# Patient Record
Sex: FEMALE | Race: WHITE | URBAN - METROPOLITAN AREA
[De-identification: names, ages, dates, MRNs, and addresses within clinical notes are randomized per-mention and may not be internally consistent; named-entity substitution may affect disease eponyms.]

---

## 2021-02-24 ENCOUNTER — NEW PATIENT (OUTPATIENT)
Dept: URBAN - METROPOLITAN AREA CLINIC 44 | Facility: CLINIC | Age: 71
End: 2021-02-24
Payer: MEDICARE

## 2021-02-24 DIAGNOSIS — H57.11 OCULAR PAIN, RIGHT EYE: Primary | ICD-10-CM

## 2021-02-24 DIAGNOSIS — H43.393 OTHER VITREOUS OPACITIES, BILATERAL: ICD-10-CM

## 2021-02-24 DIAGNOSIS — H04.123 TEAR FILM INSUFFICIENCY OF BILATERAL LACRIMAL GLANDS: ICD-10-CM

## 2021-02-24 PROCEDURE — 92134 CPTRZ OPH DX IMG PST SGM RTA: CPT | Performed by: OPTOMETRIST

## 2021-02-24 PROCEDURE — 92004 COMPRE OPH EXAM NEW PT 1/>: CPT | Performed by: OPTOMETRIST

## 2021-02-24 ASSESSMENT — INTRAOCULAR PRESSURE
OS: 14
OD: 14

## 2021-02-24 ASSESSMENT — VISUAL ACUITY
OS: 20/30
OD: 20/30

## 2022-02-28 ENCOUNTER — OFFICE VISIT (OUTPATIENT)
Dept: URBAN - METROPOLITAN AREA CLINIC 44 | Facility: CLINIC | Age: 72
End: 2022-02-28
Payer: MEDICARE

## 2022-02-28 DIAGNOSIS — H02.201 LAGOPHTHALMOS OF RIGHT UPPER LID: ICD-10-CM

## 2022-02-28 DIAGNOSIS — H35.371 PUCKERING OF MACULA, RIGHT EYE: Primary | ICD-10-CM

## 2022-02-28 DIAGNOSIS — H52.223 REGULAR ASTIGMATISM, BILATERAL: ICD-10-CM

## 2022-02-28 DIAGNOSIS — Z96.1 PRESENCE OF INTRAOCULAR LENS: ICD-10-CM

## 2022-02-28 PROCEDURE — 92014 COMPRE OPH EXAM EST PT 1/>: CPT | Performed by: OPTOMETRIST

## 2022-02-28 PROCEDURE — 92134 CPTRZ OPH DX IMG PST SGM RTA: CPT | Performed by: OPTOMETRIST

## 2022-02-28 ASSESSMENT — VISUAL ACUITY
OS: 20/30
OD: 20/25

## 2022-02-28 ASSESSMENT — INTRAOCULAR PRESSURE
OD: 12
OS: 10

## 2022-02-28 ASSESSMENT — KERATOMETRY
OS: 42.13
OD: 44.25

## 2022-02-28 NOTE — IMPRESSION/PLAN
Impression: Puckering of macula, right eye Patient has no significant visual complaints at this time. Denies distortion. Per OCT no ME. Plan: Discussed findings with patient. Observe. RTC 12 months for complete + possible OCT(Mac).  Sooner if changes in vision are observed

## 2022-02-28 NOTE — IMPRESSION/PLAN
Impression: Lagophthalmos of right upper lid Stable Plan: PLAN: Continue with taping eye shut at night. Aggressive PF AT and jeane or gel before bedtime.

## 2022-02-28 NOTE — IMPRESSION/PLAN
Impression: Tear film insufficiency of bilateral lacrimal glands Plan: PLAN: Recommend Lipid based tears to be used 4X daily. Rec 2000 mg Triglyceride based Omega 3 to be taken daily. Observe condition and RTC if symptom's worsen.

## 2022-02-28 NOTE — IMPRESSION/PLAN
Impression: Other vitreous opacities, bilateral
 Reports no new floaters, flashes  or veiling. No retinal defects noted. Patient reports occasional floaters which are not interfering with daily activities and vision. Plan: Patient states vision is stable with no new floaters, flashes  or veiling. Reports they see occasional floaters which are not interfering with daily activities and vision.  PLAN: RTC if experiences increase in floaters, flashes or veiling

## 2023-03-01 ENCOUNTER — OFFICE VISIT (OUTPATIENT)
Dept: URBAN - METROPOLITAN AREA CLINIC 44 | Facility: CLINIC | Age: 73
End: 2023-03-01
Payer: MEDICARE

## 2023-03-01 DIAGNOSIS — H02.201 LAGOPHTHALMOS OF RIGHT UPPER LID: ICD-10-CM

## 2023-03-01 DIAGNOSIS — H04.123 TEAR FILM INSUFFICIENCY OF BILATERAL LACRIMAL GLANDS: Primary | ICD-10-CM

## 2023-03-01 DIAGNOSIS — Z96.1 PRESENCE OF INTRAOCULAR LENS: ICD-10-CM

## 2023-03-01 DIAGNOSIS — H43.393 OTHER VITREOUS OPACITIES, BILATERAL: ICD-10-CM

## 2023-03-01 DIAGNOSIS — H52.223 REGULAR ASTIGMATISM, BILATERAL: ICD-10-CM

## 2023-03-01 PROCEDURE — 99214 OFFICE O/P EST MOD 30 MIN: CPT | Performed by: OPTOMETRIST

## 2023-03-01 ASSESSMENT — KERATOMETRY
OS: 42.75
OD: 42.25

## 2023-03-01 ASSESSMENT — VISUAL ACUITY
OS: 20/30
OD: 20/25

## 2023-03-01 ASSESSMENT — INTRAOCULAR PRESSURE
OD: 11
OS: 12

## 2023-03-01 NOTE — IMPRESSION/PLAN
Impression: Tear film insufficiency of bilateral lacrimal glands Plan: PLAN: Continue Lipid based tears to be used 4X daily. Sent pt information to [facility] for AFTs to be used QID OU. Observe condition and RTC 6-7 months for short or sooner if symptom's worsen.

## 2023-03-01 NOTE — IMPRESSION/PLAN
Impression: Lagophthalmos of right upper lid Stable Plan: PLAN: Continue with taping eye shut at night PRN.  See R36.647

## 2023-03-01 NOTE — IMPRESSION/PLAN
Impression: Other vitreous opacities, bilateral
 Plan: PLAN: Discussed S/S of RD/RT. Stressed importance to RTC immediately if S/S appear to worsen, otherwise observe.

## 2024-03-19 ENCOUNTER — OFFICE VISIT (OUTPATIENT)
Dept: URBAN - METROPOLITAN AREA CLINIC 44 | Facility: CLINIC | Age: 74
End: 2024-03-19
Payer: MEDICARE

## 2024-03-19 DIAGNOSIS — H43.393 OTHER VITREOUS OPACITIES, BILATERAL: Primary | ICD-10-CM

## 2024-03-19 DIAGNOSIS — H52.223 REGULAR ASTIGMATISM, BILATERAL: ICD-10-CM

## 2024-03-19 DIAGNOSIS — H04.123 TEAR FILM INSUFFICIENCY OF BILATERAL LACRIMAL GLANDS: ICD-10-CM

## 2024-03-19 DIAGNOSIS — H02.201 LAGOPHTHALMOS OF RIGHT UPPER LID: ICD-10-CM

## 2024-03-19 DIAGNOSIS — Z96.1 PRESENCE OF INTRAOCULAR LENS: ICD-10-CM

## 2024-03-19 PROCEDURE — 92014 COMPRE OPH EXAM EST PT 1/>: CPT | Performed by: OPTOMETRIST

## 2024-03-19 ASSESSMENT — KERATOMETRY: OS: 45.13

## 2024-03-19 ASSESSMENT — INTRAOCULAR PRESSURE
OD: 10
OS: 10

## 2024-03-19 ASSESSMENT — VISUAL ACUITY
OD: 20/25
OS: 20/30